# Patient Record
Sex: MALE
[De-identification: names, ages, dates, MRNs, and addresses within clinical notes are randomized per-mention and may not be internally consistent; named-entity substitution may affect disease eponyms.]

---

## 2018-02-21 ENCOUNTER — HOSPITAL ENCOUNTER (OUTPATIENT)
Dept: HOSPITAL 89 - RAD | Age: 58
End: 2018-02-21
Attending: INTERNAL MEDICINE
Payer: COMMERCIAL

## 2018-02-21 DIAGNOSIS — M47.897: Primary | ICD-10-CM

## 2018-02-21 PROCEDURE — 72100 X-RAY EXAM L-S SPINE 2/3 VWS: CPT

## 2018-02-21 NOTE — RADIOLOGY IMAGING REPORT
[place] [facility] [place] 

 

PATIENT NAME: Zane Arenas

: 1960

MR: [id number]

V: [id number]

EXAM DATE: 

ORDERING PHYSICIAN: RADHA BARCLAY

TECHNOLOGIST: 

 

Location: [facility] [place]

Patient: Zane Arenas

: 1960

MRN: [id number]

Visit/Account:[id number]

Date of Sevice:  2018

 

ACCESSION #: [id number]

 

Exam type: LUMBAR SPINE 2 OR 3 VIEW

 

History: left leg pain

 

Comparison: None.

 

Findings:

 

There are five nonrib-bearing lumbar-type vertebral bodies present.  There is no evidence of acute fr
actures or subluxations.  There is mild disc space narrowing at L4-5 and moderate disc space narrowin
g with marginal osteophytes at L5-S1.  Small anterior osteophyte also projects from the superior endp
late of L3

 

IMPRESSION:

 

1.  Spondylotic changes lower lumbar spine as described.  If patient has continued symptoms MR may be
 helpful

 

Report Dictated By: Precious Suárez MD at 2018 5:30 PM

 

Report E-Signed By: Precious Suárez MD  at 2018 5:31 PM

 

WSN:[id number]

## 2018-04-24 ENCOUNTER — HOSPITAL ENCOUNTER (OUTPATIENT)
Dept: HOSPITAL 89 - LAB | Age: 58
End: 2018-04-24
Attending: ANESTHESIOLOGY
Payer: COMMERCIAL

## 2018-04-24 DIAGNOSIS — Z01.812: Primary | ICD-10-CM

## 2018-04-24 DIAGNOSIS — R94.31: ICD-10-CM

## 2018-04-24 DIAGNOSIS — I45.10: ICD-10-CM

## 2018-04-24 DIAGNOSIS — M51.26: ICD-10-CM

## 2018-04-24 DIAGNOSIS — R00.1: ICD-10-CM

## 2018-04-24 DIAGNOSIS — Z01.810: ICD-10-CM

## 2018-04-24 PROCEDURE — 82374 ASSAY BLOOD CARBON DIOXIDE: CPT

## 2018-04-24 PROCEDURE — 84450 TRANSFERASE (AST) (SGOT): CPT

## 2018-04-24 PROCEDURE — 84075 ASSAY ALKALINE PHOSPHATASE: CPT

## 2018-04-24 PROCEDURE — 84460 ALANINE AMINO (ALT) (SGPT): CPT

## 2018-04-24 PROCEDURE — 84132 ASSAY OF SERUM POTASSIUM: CPT

## 2018-04-24 PROCEDURE — 82040 ASSAY OF SERUM ALBUMIN: CPT

## 2018-04-24 PROCEDURE — 82435 ASSAY OF BLOOD CHLORIDE: CPT

## 2018-04-24 PROCEDURE — 82247 BILIRUBIN TOTAL: CPT

## 2018-04-24 PROCEDURE — 84295 ASSAY OF SERUM SODIUM: CPT

## 2018-04-24 PROCEDURE — 84520 ASSAY OF UREA NITROGEN: CPT

## 2018-04-24 PROCEDURE — 82947 ASSAY GLUCOSE BLOOD QUANT: CPT

## 2018-04-24 PROCEDURE — 84155 ASSAY OF PROTEIN SERUM: CPT

## 2018-04-24 PROCEDURE — 82565 ASSAY OF CREATININE: CPT

## 2018-04-24 PROCEDURE — 36415 COLL VENOUS BLD VENIPUNCTURE: CPT

## 2018-04-24 PROCEDURE — 82310 ASSAY OF CALCIUM: CPT

## 2018-04-24 PROCEDURE — 93005 ELECTROCARDIOGRAM TRACING: CPT

## 2018-04-24 NOTE — EKG
FACILITY: [facility] 

 

PATIENT NAME: ROBLES LAFLEUR

: [id number]

MR: [id number]

V: [id number]

EXAM DATE: 

ORDERING PHYSICIAN: DALIA KIRK

TECHNOLOGIST: BITNER

 

Test Reason : PREOP

Blood Pressure : ***/*** mmHG

Vent. Rate : 054 BPM     Atrial Rate : 054 BPM

   P-R Int : 168 ms          QRS Dur : 148 ms

    QT Int : 454 ms       P-R-T Axes : 073 098 068 degrees

   QTc Int : 430 ms

 

Sinus bradycardia

Right bundle branch block

Abnormal ECG

No previous ECGs available

 

Referred By:             Confirmed By:

## 2018-12-12 ENCOUNTER — HOSPITAL ENCOUNTER (OUTPATIENT)
Dept: HOSPITAL 89 - NUC | Age: 58
End: 2018-12-12
Attending: INTERNAL MEDICINE
Payer: COMMERCIAL

## 2018-12-12 DIAGNOSIS — R94.31: Primary | ICD-10-CM

## 2018-12-12 PROCEDURE — 78452 HT MUSCLE IMAGE SPECT MULT: CPT

## 2018-12-12 PROCEDURE — 93017 CV STRESS TEST TRACING ONLY: CPT

## 2018-12-12 NOTE — RADIOLOGY IMAGING REPORT
FACILITY: [facility] 

 

PATIENT NAME: Cholo Arenas

: 1960

MR: [id number]

V: [id number]

EXAM DATE: 

ORDERING PHYSICIAN: LEONOR ARNOLD

TECHNOLOGIST: 

 

Location: [facility]

Patient: Cholo Arenas

: 1960

MRN: [id number]

Visit/Account:[id number]

Date of Sevice: 2018

 

ACCESSION #: [id number]

 

EXAMINATION:  Single isotope SPECT imaging with regadenoson infusion and gated SPECT imaging.

 

DATE OF EXAMINATION:  2018.

 

DATE OF INTERPRETATION:  2018.

 

REQUESTING PHYSICIAN:  LEONOR ARNOLD.

 

INDICATION:  The patient is a 58-year-old male evaluated for CAD.

 

PROCEDURE:  After informed consent the patient received an intravenous injection of 11.9 mCi of Tc-99
m sestamibi followed at an appropriate time interval by rest imaging.  The patient then subsequently 
received an intravenous infusion of 0.4 mg of regadenoson per protocol without complication.  Resting
 heart rate was 72 bpm with a peak heart rate of 103 bpm.  Blood pressure at rest was 166 / 100 and f
ollowing infusion was 133 / 86.  Baseline EKG demonstrates normal sinus rhythm with an interventricul
ar conduction delay and lateral Q waves.  There were no diagnostic EKG changes of ischemia following 
infusion.  Symptoms were nonspecific.  The patient then received an intravenous injection of 28.4 mCi
 of Tc-99m sestamibi followed by stress imaging.

 

RAW DATA:  Examination of the summed raw data revealed a good quality study.

 

MYOCARDIAL PERFUSION:  The tomographic images demonstrate normal perfusion both at rest and at stress
.

 

GATED IMAGES:  The gated images demonstrate a normal ejection fraction at 64% with normal wall motion
.

 

IMPRESSION:

 

1.  Abnormal EKG but no changes during stress.

2.  Normal myocardial perfusion scan.

3.  Normal LV systolic function; LVEF 64%.

4. Based on the results of this exam, the patient appears to be at low risk for future cardiovascular
 events.

 

Report Dictated By: Tristan Mcintyre MD at 2018 3:10 PM

 

Report E-Signed By: Tristan Mcintyre MD  at 2018 3:13 PM

 

WSN:[id number]